# Patient Record
Sex: MALE
[De-identification: names, ages, dates, MRNs, and addresses within clinical notes are randomized per-mention and may not be internally consistent; named-entity substitution may affect disease eponyms.]

---

## 2021-02-16 ENCOUNTER — APPOINTMENT (OUTPATIENT)
Dept: PODIATRY | Facility: CLINIC | Age: 36
End: 2021-02-16
Payer: COMMERCIAL

## 2021-02-16 VITALS — BODY MASS INDEX: 32.2 KG/M2 | HEIGHT: 71 IN | WEIGHT: 230 LBS

## 2021-02-16 DIAGNOSIS — M77.42 METATARSALGIA, LEFT FOOT: ICD-10-CM

## 2021-02-16 DIAGNOSIS — M25.80 OTHER SPECIFIED JOINT DISORDERS, UNSPECIFIED JOINT: ICD-10-CM

## 2021-02-16 DIAGNOSIS — M77.51 OTHER ENTHESOPATHY OF RT FOOT AND ANKLE: ICD-10-CM

## 2021-02-16 DIAGNOSIS — M77.52 OTHER ENTHESOPATHY OF LT FOOT AND ANKLE: ICD-10-CM

## 2021-02-16 DIAGNOSIS — M77.41 METATARSALGIA, RIGHT FOOT: ICD-10-CM

## 2021-02-16 PROBLEM — Z00.00 ENCOUNTER FOR PREVENTIVE HEALTH EXAMINATION: Status: ACTIVE | Noted: 2021-02-16

## 2021-02-16 PROCEDURE — 73630 X-RAY EXAM OF FOOT: CPT | Mod: RT

## 2021-02-16 PROCEDURE — 99203 OFFICE O/P NEW LOW 30 MIN: CPT | Mod: 25

## 2021-02-16 PROCEDURE — 99072 ADDL SUPL MATRL&STAF TM PHE: CPT

## 2021-02-16 RX ORDER — NAPROXEN 500 MG/1
500 TABLET ORAL TWICE DAILY
Qty: 60 | Refills: 1 | Status: ACTIVE | COMMUNITY
Start: 2021-02-16 | End: 1900-01-01

## 2021-02-16 NOTE — HISTORY OF PRESENT ILLNESS
[FreeTextEntry1] : Location: sesamoids (tibial right, fibular left)\par Duration: > 1 year\par Chronic:yes\par Past Tx: none, shoe\par Exacerbated by: WB\par \par

## 2021-02-16 NOTE — REASON FOR VISIT
[Initial Visit] : an initial visit for [Foot Pain] : foot pain [FreeTextEntry2] : both feet right >>left

## 2021-02-16 NOTE — REVIEW OF SYSTEMS
[Leg Claudication] : no intermittent leg claudication [Lower Ext Edema] : no extremity edema [As Noted in HPI] : as noted in HPI [Negative] : Heme/Lymph

## 2021-02-16 NOTE — PROCEDURE
[FreeTextEntry1] : X-rays were taken in the office multiple views right foot\par bipartite vs old fx tibial sasamoid\par X-rays were taken in the office multiple views of the left foot\par bipartite fibular  vs old fx\par \par Based on my physical examination and my clinical findings and the patient's description of the symptoms, a complete differential diagnosis was reviewed with the patient. Possible diagnoses as well as treatment options explained in great detail. All questions asked and answered appropriately.\par \par A complete and thorough evaluation of the type of shoes they should be wearing and type of shoes for this time of year was discussed with patient.\par \par Since the patient is currently taking non-steroidal anti-inflammatory medication I had a complete and thorough discussion with the patient regarding risks and benefits of these types of medications. The most common side effects include but are not limited to gastrointestinal upset, blood in the stool, nausea, diarrhea, as well as tinnitus. I have advised the patient that if they should experience any of the side effects that she discontinue them at once and contacted primary care physician for immediate followup. I did advise the patient that these types of medications can be taken on an as needed basis and if they are not having pain he should not take them. I also advised that the patient should follow the explicit instructions on the labile and not to exceed the recommended dosage.\par Copy of my range of motion and strengthening exercises were distributed to the patient. I also explained that the patient must do these daily as well as ice afterwards. I also advised the type of shoe gear that would both support as well as conversely aggravate this condition\par follow up appt 4 weeks\par \par During the evaluation and management I had a lengthy discussion with the patient regarding benefits of functional foot orthoses. I explained to the patient the etiology and treatment options and one of them included the offloading and balancing of the painful portion of the foot. I explained the importance of balancing in offloading the painful area as part of the overall treatment process to advance healing. I have asked the patient to consider this as part of the treatment\par

## 2021-02-16 NOTE — PHYSICAL EXAM
[General Appearance - Alert] : alert [General Appearance - In No Acute Distress] : in no acute distress [FreeTextEntry3] : Vascular exam reveals palpable pedal pulses, the foot is warm to touch, there was good capillary fill time, the skin is normal in appearance there is no evidence of vascular disease or compromise at this time [No Joint Swelling] : no joint swelling [Normal Foot/Ankle] : Both lower extremities were exposed and visualized. Standing exam demonstrates normal foot posture and alignment. Hindfoot exam shows no hindfoot valgus or varus [de-identified] : no trauma, no injury chronic in nature, s and sx c/w sesamoiditis bila right >>left tibial right fibular left [Skin Color & Pigmentation] : normal skin color and pigmentation [] : no rash [Skin Turgor] : normal skin turgor [Skin Lesions] : no skin lesions [Foot Ulcer] : no foot ulcer [Skin Induration] : no skin induration [Sensation] : the sensory exam was normal to light touch and pinprick [No Focal Deficits] : no focal deficits [Deep Tendon Reflexes (DTR)] : deep tendon reflexes were 2+ and symmetric [Motor Exam] : the motor exam was normal [Oriented To Time, Place, And Person] : oriented to person, place, and time [Impaired Insight] : insight and judgment were intact [Affect] : the affect was normal

## 2021-03-09 ENCOUNTER — APPOINTMENT (OUTPATIENT)
Dept: PODIATRY | Facility: CLINIC | Age: 36
End: 2021-03-09